# Patient Record
Sex: MALE | Race: OTHER | NOT HISPANIC OR LATINO | Employment: FULL TIME | URBAN - METROPOLITAN AREA
[De-identification: names, ages, dates, MRNs, and addresses within clinical notes are randomized per-mention and may not be internally consistent; named-entity substitution may affect disease eponyms.]

---

## 2017-10-09 ENCOUNTER — APPOINTMENT (OUTPATIENT)
Dept: SOCIAL WORK | Facility: CLINIC | Age: 42
End: 2017-10-09

## 2017-10-09 PROCEDURE — 90686 IIV4 VACC NO PRSV 0.5 ML IM: CPT | Performed by: REGISTERED NURSE

## 2018-10-05 ENCOUNTER — IMMUNIZATION (OUTPATIENT)
Dept: SOCIAL WORK | Facility: CLINIC | Age: 43
End: 2018-10-05
Payer: COMMERCIAL

## 2018-10-05 DIAGNOSIS — Z23 NEED FOR VACCINATION: ICD-10-CM

## 2018-10-05 PROCEDURE — 90471 IMMUNIZATION ADMIN: CPT | Performed by: REGISTERED NURSE

## 2018-10-05 PROCEDURE — 90686 IIV4 VACC NO PRSV 0.5 ML IM: CPT | Performed by: REGISTERED NURSE

## 2022-05-03 ENCOUNTER — OFFICE VISIT (OUTPATIENT)
Dept: URGENT CARE | Facility: CLINIC | Age: 47
End: 2022-05-03
Payer: COMMERCIAL

## 2022-05-03 VITALS
RESPIRATION RATE: 16 BRPM | SYSTOLIC BLOOD PRESSURE: 114 MMHG | WEIGHT: 176 LBS | DIASTOLIC BLOOD PRESSURE: 64 MMHG | TEMPERATURE: 97.5 F | HEIGHT: 68 IN | BODY MASS INDEX: 26.67 KG/M2 | OXYGEN SATURATION: 94 % | HEART RATE: 100 BPM

## 2022-05-03 DIAGNOSIS — K29.70 VIRAL GASTRITIS: ICD-10-CM

## 2022-05-03 DIAGNOSIS — R11.2 NAUSEA AND VOMITING, INTRACTABILITY OF VOMITING NOT SPECIFIED, UNSPECIFIED VOMITING TYPE: ICD-10-CM

## 2022-05-03 PROCEDURE — 99203 OFFICE O/P NEW LOW 30 MIN: CPT | Performed by: STUDENT IN AN ORGANIZED HEALTH CARE EDUCATION/TRAINING PROGRAM

## 2022-05-03 RX ORDER — ONDANSETRON 4 MG/1
4 TABLET, ORALLY DISINTEGRATING ORAL ONCE
Status: COMPLETED | OUTPATIENT
Start: 2022-05-03 | End: 2022-05-03

## 2022-05-03 RX ORDER — ONDANSETRON 2 MG/ML
4 INJECTION INTRAMUSCULAR; INTRAVENOUS ONCE
Status: COMPLETED | OUTPATIENT
Start: 2022-05-03 | End: 2022-05-03

## 2022-05-03 RX ORDER — ONDANSETRON 4 MG/1
4 TABLET, FILM COATED ORAL EVERY 4 HOURS PRN
Qty: 20 TABLET | Refills: 0 | Status: SHIPPED | OUTPATIENT
Start: 2022-05-03 | End: 2022-05-06

## 2022-05-03 RX ADMIN — ONDANSETRON 4 MG: 2 INJECTION INTRAMUSCULAR; INTRAVENOUS at 19:05

## 2022-05-03 RX ADMIN — ONDANSETRON 4 MG: 4 TABLET, ORALLY DISINTEGRATING ORAL at 18:45

## 2022-05-04 NOTE — PROGRESS NOTES
Subjective:   Jose Busby is a 46 y.o. male who presents for Fever (Had fever x 1 day, had lower abdominal pain, worsened x 1 day, had sweats, dizziness, achy, keeps on burping and vomiting, feels weak this morning, sore throat after vomiting)      HPI:  Pleasant 46-year-old male presents the clinic for vomiting that started early this morning around 4 AM.  Patient states that he has had 6 episodes of vomiting since this time.  Patient states that he has some lower abdominal pain when he vomits.  He states that he has been able to keep some fluids down.  He has not been able to keep any solids down.  Patient states that he does not feel dehydrated.  His abdominal pain is relieved when he vomits.  Patient also reports that he had 1 episode of diarrhea yesterday but denies any blood or melena.  No sick contacts at home.  Patient does not have a fever in clinic.  Patient is not on any antipyretic medication at this time.  Patient denies current fever, chills, diaphoresis, rash, sore throat, chest pain, palpitations, lower leg swelling, lower leg pain, cough, sputum production, shortness of breath, wheezing, constipation, back pain, falls, dizziness, loss of consciousness, headache, or near syncope.      Medications:    • ondansetron Tabs  • OSCILLOCOCCINUM PO    Allergies: Patient has no known allergies.    Problem List: Jose Busby does not have a problem list on file.    Surgical History:  No past surgical history on file.    Past Social Hx: Jose Busby  reports that he has never smoked. He has never used smokeless tobacco. He reports previous alcohol use. He reports that he does not use drugs.     Past Family Hx:  Jose Busby family history is not on file.     Problem list, medications, and allergies reviewed by myself today in Epic.     Objective:     /64 (BP Location: Left arm, Patient Position: Sitting, BP Cuff Size: Adult)   Pulse 100   Temp 36.4 °C (97.5 °F) (Temporal)   Resp 16    "Ht 1.727 m (5' 8\")   Wt 79.8 kg (176 lb)   SpO2 94%   BMI 26.76 kg/m²     Physical Exam  Vitals reviewed.   Constitutional:       Appearance: Normal appearance.   HENT:      Head: Normocephalic.      Mouth/Throat:      Mouth: Mucous membranes are moist.   Eyes:      Conjunctiva/sclera: Conjunctivae normal.      Pupils: Pupils are equal, round, and reactive to light.   Cardiovascular:      Rate and Rhythm: Normal rate and regular rhythm.      Pulses: Normal pulses.      Heart sounds: Normal heart sounds. No murmur heard.  Pulmonary:      Effort: Pulmonary effort is normal. No respiratory distress.      Breath sounds: Normal breath sounds. No stridor. No wheezing, rhonchi or rales.   Abdominal:      General: Abdomen is flat. Bowel sounds are normal.      Palpations: Abdomen is soft.      Tenderness: There is abdominal tenderness in the epigastric area. There is no right CVA tenderness, left CVA tenderness, guarding or rebound. Negative signs include Armenta's sign, Rovsing's sign and McBurney's sign.   Musculoskeletal:      Cervical back: Normal range of motion. No rigidity.   Skin:     General: Skin is warm and dry.      Capillary Refill: Capillary refill takes less than 2 seconds.      Findings: No erythema, lesion or rash.   Neurological:      General: No focal deficit present.      Mental Status: He is alert and oriented to person, place, and time.         Assessment/Plan:     Diagnosis and associated orders:     1. Nausea and vomiting, intractability of vomiting not specified, unspecified vomiting type  ondansetron (ZOFRAN ODT) dispertab 4 mg    ondansetron (ZOFRAN) syringe/vial injection 4 mg    ondansetron (ZOFRAN) 4 MG Tab tablet      Comments/MDM:     • Patient's presentation and physical exam findings are consistent with viral gastroenteritis.  Patient is afebrile at this time.  Patient's abdominal exam only shows mild tenderness to palpation in the epigastric region with no guarding or rigidity.  Low " suspicion for appendicitis, diverticulitis, or pancreatitis.  Patient states his pain is relieved with vomiting.  • Patient had 1 episode of vomiting in clinic.  Patient was given 4 mg Zofran ODT which she tolerated well.  Patient was given another 4 mg of Zofran through IM.  Patient's nausea was relieved following Zofran use.  Patient had no further vomiting in clinic following Zofran administration.  Patient's physical exam is also reassuring with regards to dehydration.  Oral mucous membranes are moist.  Patient's vitals are all within normal limits.  • Discussed with the patient home supportive care.  Patient is stable at this time and will be given prescription of Zofran.  Patient was advised on how to use this medication and the possible side effects.  Patient was advised that aggressive oral fluid hydration is the most important thing right now in order to prevent any dehydration from occurring.  Patient should take small sips of fluid very regularly.  Patient should drink water, Pedialyte, Gatorade, electrolyte drinks, ginger ale.  Patient may also try to eat solid foods as tolerated.  If patient does have solid foods he should use bland foods.  • Patient was given strict ER precautions.  Patient was advised that he needs to present to the ER immediately if his vomiting cannot be controlled with Zofran, he is unable to maintain adequate oral intake of fluids, has increasing abdominal pain, new onset fever, chest pain, shortness of breath, dizziness, or severe headache.  Patient has good understanding of the signs and symptoms that warrant immediate reevaluation in the ER.         Differential diagnosis, natural history, supportive care, and indications for immediate follow-up discussed.    Advised the patient to follow-up with the primary care physician for recheck, reevaluation, and consideration of further management.    Please note that this dictation was created using voice recognition software. I have  made a reasonable attempt to correct obvious errors, but I expect that there are errors of grammar and possibly content that I did not discover before finalizing the note.    Electronically signed by Darell Escoto PA-C.

## 2025-08-08 ENCOUNTER — APPOINTMENT (OUTPATIENT)
Dept: RADIOLOGY | Facility: IMAGING CENTER | Age: 50
End: 2025-08-08
Attending: FAMILY MEDICINE
Payer: COMMERCIAL

## 2025-08-08 ENCOUNTER — HOSPITAL ENCOUNTER (EMERGENCY)
Facility: MEDICAL CENTER | Age: 50
End: 2025-08-08
Payer: COMMERCIAL

## 2025-08-08 ENCOUNTER — OFFICE VISIT (OUTPATIENT)
Dept: URGENT CARE | Facility: CLINIC | Age: 50
End: 2025-08-08
Payer: COMMERCIAL

## 2025-08-08 VITALS
DIASTOLIC BLOOD PRESSURE: 64 MMHG | RESPIRATION RATE: 18 BRPM | SYSTOLIC BLOOD PRESSURE: 102 MMHG | BODY MASS INDEX: 25.76 KG/M2 | HEIGHT: 68 IN | OXYGEN SATURATION: 95 % | WEIGHT: 170 LBS | TEMPERATURE: 98.2 F | HEART RATE: 80 BPM

## 2025-08-08 DIAGNOSIS — R10.9 BELLY PAIN: Primary | ICD-10-CM

## 2025-08-08 DIAGNOSIS — R10.9 BELLY PAIN: ICD-10-CM

## 2025-08-08 LAB
APPEARANCE UR: CLEAR
BILIRUB UR STRIP-MCNC: NEGATIVE MG/DL
COLOR UR AUTO: YELLOW
GLUCOSE UR STRIP.AUTO-MCNC: NEGATIVE MG/DL
KETONES UR STRIP.AUTO-MCNC: NORMAL MG/DL
LEUKOCYTE ESTERASE UR QL STRIP.AUTO: NEGATIVE
NITRITE UR QL STRIP.AUTO: NEGATIVE
PH UR STRIP.AUTO: 7 [PH] (ref 5–8)
PROT UR QL STRIP: 30 MG/DL
RBC UR QL AUTO: NORMAL
SP GR UR STRIP.AUTO: 1.02
UROBILINOGEN UR STRIP-MCNC: 0.2 MG/DL

## 2025-08-08 PROCEDURE — 74018 RADEX ABDOMEN 1 VIEW: CPT | Mod: TC,FY | Performed by: RADIOLOGY

## 2025-08-08 PROCEDURE — 81002 URINALYSIS NONAUTO W/O SCOPE: CPT | Performed by: FAMILY MEDICINE

## 2025-08-08 PROCEDURE — 3074F SYST BP LT 130 MM HG: CPT | Performed by: FAMILY MEDICINE

## 2025-08-08 PROCEDURE — 3078F DIAST BP <80 MM HG: CPT | Performed by: FAMILY MEDICINE

## 2025-08-08 PROCEDURE — 99205 OFFICE O/P NEW HI 60 MIN: CPT | Performed by: FAMILY MEDICINE

## 2025-08-08 RX ORDER — ONDANSETRON 2 MG/ML
4 INJECTION INTRAMUSCULAR; INTRAVENOUS ONCE
Status: COMPLETED | OUTPATIENT
Start: 2025-08-08 | End: 2025-08-08

## 2025-08-08 RX ADMIN — ONDANSETRON 4 MG: 2 INJECTION INTRAMUSCULAR; INTRAVENOUS at 09:32

## 2025-08-11 ENCOUNTER — TELEPHONE (OUTPATIENT)
Dept: HEALTH INFORMATION MANAGEMENT | Facility: OTHER | Age: 50
End: 2025-08-11
Payer: COMMERCIAL